# Patient Record
Sex: FEMALE | Race: WHITE
[De-identification: names, ages, dates, MRNs, and addresses within clinical notes are randomized per-mention and may not be internally consistent; named-entity substitution may affect disease eponyms.]

---

## 2019-07-12 ENCOUNTER — HOSPITAL ENCOUNTER (INPATIENT)
Dept: HOSPITAL 96 - M.ERS | Age: 44
LOS: 1 days | Discharge: HOME | DRG: 153 | End: 2019-07-13
Attending: INTERNAL MEDICINE | Admitting: INTERNAL MEDICINE
Payer: COMMERCIAL

## 2019-07-12 VITALS — SYSTOLIC BLOOD PRESSURE: 133 MMHG | DIASTOLIC BLOOD PRESSURE: 72 MMHG

## 2019-07-12 VITALS — DIASTOLIC BLOOD PRESSURE: 80 MMHG | SYSTOLIC BLOOD PRESSURE: 122 MMHG

## 2019-07-12 VITALS — BODY MASS INDEX: 26.66 KG/M2 | WEIGHT: 180 LBS | HEIGHT: 69.02 IN

## 2019-07-12 DIAGNOSIS — Z90.49: ICD-10-CM

## 2019-07-12 DIAGNOSIS — J45.909: ICD-10-CM

## 2019-07-12 DIAGNOSIS — Z91.018: ICD-10-CM

## 2019-07-12 DIAGNOSIS — T78.40XA: ICD-10-CM

## 2019-07-12 DIAGNOSIS — Z90.710: ICD-10-CM

## 2019-07-12 DIAGNOSIS — J02.9: Primary | ICD-10-CM

## 2019-07-12 DIAGNOSIS — Z88.8: ICD-10-CM

## 2019-07-12 DIAGNOSIS — R13.10: ICD-10-CM

## 2019-07-12 DIAGNOSIS — Z91.041: ICD-10-CM

## 2019-07-12 DIAGNOSIS — Z79.899: ICD-10-CM
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
## 2019-07-12 DIAGNOSIS — Z82.49: ICD-10-CM

## 2019-07-12 DIAGNOSIS — Z86.718: ICD-10-CM

## 2019-07-12 DIAGNOSIS — Z86.711: ICD-10-CM

## 2019-07-12 LAB
ABSOLUTE BASOPHILS: 0 THOU/UL (ref 0–0.2)
ABSOLUTE EOSINOPHILS: 0.1 THOU/UL (ref 0–0.7)
ABSOLUTE MONOCYTES: 0.8 THOU/UL (ref 0–1.2)
ANION GAP SERPL CALC-SCNC: 11 MMOL/L (ref 7–16)
BASOPHILS NFR BLD AUTO: 0.4 %
BUN SERPL-MCNC: 10 MG/DL (ref 7–18)
CALCIUM SERPL-MCNC: 8.8 MG/DL (ref 8.5–10.1)
CHLORIDE SERPL-SCNC: 103 MMOL/L (ref 98–107)
CO2 SERPL-SCNC: 27 MMOL/L (ref 21–32)
CREAT SERPL-MCNC: 0.8 MG/DL (ref 0.6–1.3)
EOSINOPHIL NFR BLD: 1.7 %
GLUCOSE SERPL-MCNC: 153 MG/DL (ref 70–99)
GRANULOCYTES NFR BLD MANUAL: 45.1 %
HCT VFR BLD CALC: 42.1 % (ref 37–47)
HGB BLD-MCNC: 14.5 GM/DL (ref 12–15)
LYMPHOCYTES # BLD: 3.1 THOU/UL (ref 0.8–5.3)
LYMPHOCYTES NFR BLD AUTO: 42.4 %
MCH RBC QN AUTO: 32.8 PG (ref 26–34)
MCHC RBC AUTO-ENTMCNC: 34.4 G/DL (ref 28–37)
MCV RBC: 95.2 FL (ref 80–100)
MONOCYTES NFR BLD: 10.4 %
MPV: 9.3 FL. (ref 7.2–11.1)
NEUTROPHILS # BLD: 3.3 THOU/UL (ref 1.6–8.1)
NUCLEATED RBCS: 0 /100WBC
PLATELET COUNT*: 281 THOU/UL (ref 150–400)
POTASSIUM SERPL-SCNC: 3.2 MMOL/L (ref 3.5–5.1)
RBC # BLD AUTO: 4.43 MIL/UL (ref 4.2–5)
RDW-CV: 12.8 % (ref 10.5–14.5)
SODIUM SERPL-SCNC: 141 MMOL/L (ref 136–145)
WBC # BLD AUTO: 7.3 THOU/UL (ref 4–11)

## 2019-07-13 VITALS — SYSTOLIC BLOOD PRESSURE: 116 MMHG | DIASTOLIC BLOOD PRESSURE: 71 MMHG

## 2019-07-13 VITALS — DIASTOLIC BLOOD PRESSURE: 71 MMHG | SYSTOLIC BLOOD PRESSURE: 116 MMHG

## 2019-07-13 VITALS — DIASTOLIC BLOOD PRESSURE: 68 MMHG | SYSTOLIC BLOOD PRESSURE: 108 MMHG

## 2019-07-13 VITALS — SYSTOLIC BLOOD PRESSURE: 130 MMHG | DIASTOLIC BLOOD PRESSURE: 78 MMHG

## 2019-07-13 NOTE — NUR
RECEIVED REPORT FORM ANN AND ASSUMED CARE OF PT @ 0700.PT IS A/O
X4,VSS,TRACING SR ON THE MONITOR.IV PATENT WITH IVF INFUSING PER ORDERS.PT IS
CALM AND COOPERATIVE WITH NO C/O PAIN.PT LEFT RESTING IN BED WITH CALL LIGHT
WITHIN REACH.WILL CONTINUE TO MONITOR.

## 2019-07-13 NOTE — NUR
RECEIVED REPORT FROM ER [facility] AT 1909. PT ARRIVED TO UNIT AT 1930. NURSING
ASSESSMENT COMPLETED, SR ON CARDIAC MONITOR. IVF INFUSING. C/O NAUSEA THIS
SHIFT. IV ZOFRAN ADMINISTERED AND EFFECTIVE. HOURLY ROUNDING COMPLETED. CALL
LIGHT WITHIN REACH.

## 2019-07-13 NOTE — NUR
PT OK FOR DISCHARGE.PAPERWORK COMPLETED AND GIVEN TO PT.SCRIPT GIVEN WITH
EDUCATION.IV REMOVED. HEART MONITOR REMOVED AND RETUNRED TO NURSING
STATION.ALL PERSONAL BELONGINGS PACKED AND TAKEN WITH PT.PT WHEELED DOWN TO
PERSONAL VEHICLE.

## 2019-07-15 NOTE — EKG
[place] [address]
Phone:  [phone]                     ELECTROCARDIOGRAM REPORT      
_______________________________________________________________________________
 
Name:       YAMILETJAMILAH            Room:           [address]    DIS IN  
M.R.#:  [id number]      Account #:      [id number]  
Admission:  19     Attend Phys:    CHOLO Robbins
Discharge:  19     Date of Birth:  [birth year]  
         Report #: [id number]
    [id number]
_______________________________________________________________________________
THIS REPORT FOR:  //name//                      
 
                         [facility] ED
                                       
Test Date:    2019               Test Time:    16:12:13
Pat Name:     JAMILAH WOODARD        Department:   
Patient ID:   [id number]            Room:         [facility]
Gender:       F                        Technician:   KS
:          1975               Requested By: Bianca Vera
Order Number: [id number] MD:   Rl Washington
                                 Measurements
Intervals                              Axis          
Rate:         109                      P:            48
CO:           110                      QRS:          -13
QRSD:         91                       T:            48
QT:           349                                    
QTc:          471                                    
                           Interpretive Statements
Sinus tachycardia
Atrial premature complex
Low voltage, precordial leads
Minimal ST depression, lateral leads
Compared to ECG 2013 05:00:27
Atrial premature complex(es) now present
ST (T wave) deviation now present
 
Electronically Signed On 7- 12:15:49 CDT by Rl Washington
[email or web address]
 
 
 
 
 
 
 
 
 
 
 
 
 
 
 
  <ELECTRONICALLY SIGNED>
                                           By: Rl Washington MD, FACC     
  07/15/19     1215
D: 19 1612   _____________________________________
T: 19 1612   Rl Washington MD, FAC       /EPI

## 2019-12-09 ENCOUNTER — HOSPITAL ENCOUNTER (EMERGENCY)
Dept: HOSPITAL 96 - M.ERS | Age: 44
Discharge: HOME | End: 2019-12-09
Payer: COMMERCIAL

## 2019-12-09 VITALS — WEIGHT: 176 LBS | BODY MASS INDEX: 26.67 KG/M2 | HEIGHT: 68 IN

## 2019-12-09 VITALS — SYSTOLIC BLOOD PRESSURE: 125 MMHG | DIASTOLIC BLOOD PRESSURE: 79 MMHG

## 2019-12-09 DIAGNOSIS — Z90.710: ICD-10-CM

## 2019-12-09 DIAGNOSIS — K52.9: Primary | ICD-10-CM

## 2019-12-09 DIAGNOSIS — N80.9: ICD-10-CM

## 2019-12-09 DIAGNOSIS — Z86.711: ICD-10-CM

## 2019-12-09 DIAGNOSIS — Z86.718: ICD-10-CM

## 2019-12-09 DIAGNOSIS — Z88.8: ICD-10-CM

## 2019-12-09 DIAGNOSIS — Z91.041: ICD-10-CM

## 2019-12-09 DIAGNOSIS — Z91.018: ICD-10-CM

## 2019-12-09 DIAGNOSIS — Z90.49: ICD-10-CM

## 2019-12-09 DIAGNOSIS — J45.909: ICD-10-CM

## 2019-12-09 LAB
ABSOLUTE BASOPHILS: 0 THOU/UL (ref 0–0.2)
ABSOLUTE EOSINOPHILS: 0 THOU/UL (ref 0–0.7)
ABSOLUTE MONOCYTES: 0.5 THOU/UL (ref 0–1.2)
ALBUMIN SERPL-MCNC: 3.6 G/DL (ref 3.4–5)
ALP SERPL-CCNC: 61 U/L (ref 46–116)
ALT SERPL-CCNC: 38 U/L (ref 30–65)
ANION GAP SERPL CALC-SCNC: 11 MMOL/L (ref 7–16)
APTT BLD: 29.6 SECONDS (ref 25–31.3)
AST SERPL-CCNC: 34 U/L (ref 15–37)
BASOPHILS NFR BLD AUTO: 0.7 %
BILIRUB SERPL-MCNC: 0.7 MG/DL
BILIRUB UR-MCNC: NEGATIVE MG/DL
BUN SERPL-MCNC: 7 MG/DL (ref 7–18)
CALCIUM SERPL-MCNC: 8.5 MG/DL (ref 8.5–10.1)
CHLORIDE SERPL-SCNC: 103 MMOL/L (ref 98–107)
CO2 SERPL-SCNC: 25 MMOL/L (ref 21–32)
COLOR UR: YELLOW
CREAT SERPL-MCNC: 0.7 MG/DL (ref 0.6–1.3)
EOSINOPHIL NFR BLD: 1.2 %
GLUCOSE SERPL-MCNC: 80 MG/DL (ref 70–99)
GRANULOCYTES NFR BLD MANUAL: 51.6 %
HCT VFR BLD CALC: 40.7 % (ref 37–47)
HGB BLD-MCNC: 14.5 GM/DL (ref 12–15)
INR PPP: 1
KETONES UR STRIP-MCNC: NEGATIVE MG/DL
LIPASE: 47 U/L (ref 73–393)
LYMPHOCYTES # BLD: 1.3 THOU/UL (ref 0.8–5.3)
LYMPHOCYTES NFR BLD AUTO: 33 %
MCH RBC QN AUTO: 33.3 PG (ref 26–34)
MCHC RBC AUTO-ENTMCNC: 35.5 G/DL (ref 28–37)
MCV RBC: 93.8 FL (ref 80–100)
MONOCYTES NFR BLD: 13.5 %
MPV: 9.8 FL. (ref 7.2–11.1)
NEUTROPHILS # BLD: 2 THOU/UL (ref 1.6–8.1)
NUCLEATED RBCS: 0 /100WBC
PLATELET COUNT*: 219 THOU/UL (ref 150–400)
POTASSIUM SERPL-SCNC: 3.8 MMOL/L (ref 3.5–5.1)
PROT SERPL-MCNC: 7.3 G/DL (ref 6.4–8.2)
PROT UR QL STRIP: NEGATIVE
PROTHROMBIN TIME: 10.7 SECONDS (ref 9.2–11.5)
RBC # BLD AUTO: 4.34 MIL/UL (ref 4.2–5)
RBC # UR STRIP: (no result) /UL
RDW-CV: 11.8 % (ref 10.5–14.5)
SODIUM SERPL-SCNC: 139 MMOL/L (ref 136–145)
SP GR UR STRIP: <= 1.005 (ref 1–1.03)
URINE CLARITY: CLEAR
URINE GLUCOSE-RANDOM: NEGATIVE
URINE LEUKOCYTES-REFLEX: NEGATIVE
URINE NITRITE-REFLEX: NEGATIVE
UROBILINOGEN UR STRIP-ACNC: 0.2 E.U./DL (ref 0.2–1)
WBC # BLD AUTO: 3.8 THOU/UL (ref 4–11)

## 2019-12-09 NOTE — EKG
[place] [address]
Phone:  [phone]                     ELECTROCARDIOGRAM REPORT      
_______________________________________________________________________________
 
Name:       YAMILETMARIZOLJAIME JOCELINE            Room:                      [facility]#:  [id number]      Account #:      [id number]  
Admission:  19     Attend Phys:                         
Discharge:  19     Date of Birth:  [birth year]  
         Report #: [id number]
    [id number]
_______________________________________________________________________________
THIS REPORT FOR:  //name//                      
 
                         [facility] ED
                                       
Test Date:    2019               Test Time:    10:36:56
Pat Name:     JAMILAH WOODARD        Department:   
Patient ID:   [id number]            Room:          
Gender:       F                        Technician:   ELIANE
:          1975               Requested By: Bianca Vera
Order Number: [id number] MD:   Attila Tapia
                                 Measurements
Intervals                              Axis          
Rate:         77                       P:            31
AL:           127                      QRS:          -20
QRSD:         115                      T:            83
QT:           415                                    
QTc:          470                                    
                           Interpretive Statements
Sinus rhythm
Nonspecific intraventricular conduction delay
Low voltage, extremity and precordial leads
Minimal ST depression, lateral leads
Compared to ECG 2019 16:12:13
 
Sinus tachycardia no longer present
ST (T wave) deviation still present
 
Electronically Signed On 2019 15:27:28 CST by Attila Tapia
[email or web address]
 
 
 
 
 
 
 
 
 
 
 
 
 
 
  <ELECTRONICALLY SIGNED>
                                           By: Attila Tapia MD, FAC      
  19     1527
D: 19 1036   _____________________________________
T: 19 1036   Attila Tapia MD, [facility]        /EPI

## 2021-04-09 ENCOUNTER — HOSPITAL ENCOUNTER (EMERGENCY)
Dept: HOSPITAL 96 - M.ERS | Age: 46
Discharge: HOME | End: 2021-04-09
Payer: COMMERCIAL

## 2021-04-09 VITALS — WEIGHT: 198 LBS | HEIGHT: 69 IN | BODY MASS INDEX: 29.33 KG/M2

## 2021-04-09 VITALS — DIASTOLIC BLOOD PRESSURE: 90 MMHG | SYSTOLIC BLOOD PRESSURE: 140 MMHG

## 2021-04-09 DIAGNOSIS — Z88.8: ICD-10-CM

## 2021-04-09 DIAGNOSIS — Z86.711: ICD-10-CM

## 2021-04-09 DIAGNOSIS — J45.909: ICD-10-CM

## 2021-04-09 DIAGNOSIS — Z91.041: ICD-10-CM

## 2021-04-09 DIAGNOSIS — N80.9: ICD-10-CM

## 2021-04-09 DIAGNOSIS — Z20.822: ICD-10-CM

## 2021-04-09 DIAGNOSIS — Z86.718: ICD-10-CM

## 2021-04-09 DIAGNOSIS — Z90.49: ICD-10-CM

## 2021-04-09 DIAGNOSIS — Z91.018: ICD-10-CM

## 2021-04-09 DIAGNOSIS — K31.84: ICD-10-CM

## 2021-04-09 DIAGNOSIS — R06.00: Primary | ICD-10-CM

## 2021-04-09 DIAGNOSIS — Z90.710: ICD-10-CM

## 2021-04-09 LAB
ABSOLUTE BASOPHILS: (no result) THOU/UL (ref 0–0.2)
ABSOLUTE BASOPHILS: 0 THOU/UL (ref 0–0.2)
ABSOLUTE EOSINOPHILS: (no result) THOU/UL (ref 0–0.7)
ABSOLUTE EOSINOPHILS: 0.1 THOU/UL (ref 0–0.7)
ABSOLUTE MONOCYTES: (no result) THOU/UL (ref 0–1.2)
ABSOLUTE MONOCYTES: 0.7 THOU/UL (ref 0–1.2)
ALBUMIN SERPL-MCNC: (no result) G/DL (ref 3.4–5)
ALBUMIN SERPL-MCNC: 3.8 G/DL (ref 3.4–5)
ALP SERPL-CCNC: (no result) U/L (ref 46–116)
ALP SERPL-CCNC: 66 U/L (ref 46–116)
ALT SERPL-CCNC: (no result) U/L (ref 30–65)
ALT SERPL-CCNC: 63 U/L (ref 30–65)
ANION GAP SERPL CALC-SCNC: (no result) MMOL/L (ref 7–16)
ANION GAP SERPL CALC-SCNC: 7 MMOL/L (ref 7–16)
AST SERPL-CCNC: (no result) U/L (ref 15–37)
AST SERPL-CCNC: 27 U/L (ref 15–37)
BASOPHILS NFR BLD AUTO: (no result) %
BASOPHILS NFR BLD AUTO: 0.6 %
BILIRUB SERPL-MCNC: (no result) MG/DL
BILIRUB SERPL-MCNC: 0.5 MG/DL
BILIRUB UR-MCNC: NEGATIVE MG/DL
BUN SERPL-MCNC: (no result) MG/DL (ref 7–18)
BUN SERPL-MCNC: 9 MG/DL (ref 7–18)
CALCIUM SERPL-MCNC: (no result) MG/DL (ref 8.5–10.1)
CALCIUM SERPL-MCNC: 9.1 MG/DL (ref 8.5–10.1)
CHLORIDE SERPL-SCNC: (no result) MMOL/L (ref 98–107)
CHLORIDE SERPL-SCNC: 106 MMOL/L (ref 98–107)
CO2 SERPL-SCNC: (no result) MMOL/L (ref 21–32)
CO2 SERPL-SCNC: 28 MMOL/L (ref 21–32)
COLOR UR: YELLOW
CREAT SERPL-MCNC: (no result) MG/DL (ref 0.6–1.3)
CREAT SERPL-MCNC: 0.8 MG/DL (ref 0.6–1.3)
EOSINOPHIL NFR BLD: (no result) %
EOSINOPHIL NFR BLD: 2 %
GLUCOSE SERPL-MCNC: (no result) MG/DL (ref 70–99)
GLUCOSE SERPL-MCNC: 95 MG/DL (ref 70–99)
GRANULOCYTES NFR BLD MANUAL: (no result) %
GRANULOCYTES NFR BLD MANUAL: 47.8 %
HCT VFR BLD CALC: (no result) % (ref 37–47)
HCT VFR BLD CALC: 43.6 % (ref 37–47)
HGB BLD-MCNC: (no result) GM/DL (ref 12–15)
HGB BLD-MCNC: 15 GM/DL (ref 12–15)
INR PPP: (no result)
KETONES UR STRIP-MCNC: NEGATIVE MG/DL
LYMPHOCYTES # BLD: (no result) THOU/UL (ref 0.8–5.3)
LYMPHOCYTES # BLD: 2 THOU/UL (ref 0.8–5.3)
LYMPHOCYTES NFR BLD AUTO: (no result) %
LYMPHOCYTES NFR BLD AUTO: 37.1 %
MAGNESIUM SERPL-MCNC: (no result) MG/DL (ref 1.8–2.4)
MCH RBC QN AUTO: (no result) PG (ref 26–34)
MCH RBC QN AUTO: 34.5 PG (ref 26–34)
MCHC RBC AUTO-ENTMCNC: (no result) G/DL (ref 28–37)
MCHC RBC AUTO-ENTMCNC: 34.3 G/DL (ref 28–37)
MCV RBC: (no result) FL (ref 80–100)
MCV RBC: 100.4 FL (ref 80–100)
MONOCYTES NFR BLD: (no result) %
MONOCYTES NFR BLD: 12.5 %
MPV: (no result) FL. (ref 7.2–11.1)
MPV: 9.5 FL. (ref 7.2–11.1)
NEUTROPHILS # BLD: (no result) THOU/UL (ref 1.6–8.1)
NEUTROPHILS # BLD: 2.6 THOU/UL (ref 1.6–8.1)
NT-PRO BRAIN NAT PEPTIDE: (no result) PG/ML (ref ?–300)
NUCLEATED RBCS: (no result) /100WBC
NUCLEATED RBCS: 0 /100WBC
PLATELET # BLD EST: (no result) 10*3/UL
PLATELET COUNT*: (no result) THOU/UL (ref 150–400)
PLATELET COUNT*: 208 THOU/UL (ref 150–400)
POTASSIUM SERPL-SCNC: (no result) MMOL/L (ref 3.5–5.1)
POTASSIUM SERPL-SCNC: 3.7 MMOL/L (ref 3.5–5.1)
PROT SERPL-MCNC: (no result) G/DL (ref 6.4–8.2)
PROT SERPL-MCNC: 7.4 G/DL (ref 6.4–8.2)
PROT UR QL STRIP: NEGATIVE
PROTHROMBIN TIME: (no result) SECONDS (ref 9.2–11.5)
RBC # BLD AUTO: (no result) MIL/UL (ref 4.2–5)
RBC # BLD AUTO: 4.34 MIL/UL (ref 4.2–5)
RBC # UR STRIP: NEGATIVE /UL
RDW-CV: (no result) % (ref 10.5–14.5)
RDW-CV: 13.3 % (ref 10.5–14.5)
SODIUM SERPL-SCNC: (no result) MMOL/L (ref 136–145)
SODIUM SERPL-SCNC: 141 MMOL/L (ref 136–145)
SP GR UR STRIP: 1.01 (ref 1–1.03)
URINE CLARITY: CLEAR
URINE GLUCOSE-RANDOM: NEGATIVE
URINE LEUKOCYTES-REFLEX: NEGATIVE
URINE NITRITE-REFLEX: NEGATIVE
UROBILINOGEN UR STRIP-ACNC: 0.2 E.U./DL (ref 0.2–1)
WBC # BLD AUTO: (no result) THOU/UL (ref 4–11)
WBC # BLD AUTO: 5.4 THOU/UL (ref 4–11)

## 2021-04-11 NOTE — EKG
[place] [address]
Phone:  [phone]                     ELECTROCARDIOGRAM REPORT      
_______________________________________________________________________________
 
Name:         YAMILETJAMILAH JOCELINE           Room:                     [facility]#:    [id number]     Account #:     [id number]  
Admission:    21    Attend Phys:                     
Discharge:    21    Date of Birth: [birth year]  
Date of Service: 21 190  Report #:      [id number]
        [id number]
_______________________________________________________________________________
THIS REPORT FOR:  //name//                      
 
                         [facility] ED
                                       
Test Date:    2021               Test Time:    19:04:21
Pat Name:     JAMILAH WOODARD        Department:   
Patient ID:   [id number]            Room:          
Gender:       F                        Technician:   CROW
:          1975               Requested By: Casie Hui
Order Number: [id number] MD:   Attila Tapia
                                 Measurements
Intervals                              Axis          
Rate:         84                       P:            29
CA:           135                      QRS:          -8
QRSD:         85                       T:            25
QT:           407                                    
QTc:          482                                    
                           Interpretive Statements
Sinus rhythm
Low voltage, precordial leads
Borderline prolonged QT interval
Compared to ECG 2019 10:36:56
 
ST (T wave) deviation no longer present
Electronically Signed On 2021 9:40:57 CDT by Attila Tapia
[email or web address]
 
 
 
 
 
 
 
 
 
 
 
 
 
 
 
 
 
 
  <ELECTRONICALLY SIGNED>
                                           By: Attila Tapia MD, formerly [facility]      
  21     0940
D: 21 1904   _____________________________________
T: 21 1904   Attila Tapia MD, formerly [facility]        /EPI

## 2022-02-19 ENCOUNTER — HOSPITAL ENCOUNTER (EMERGENCY)
Dept: HOSPITAL 96 - M.ERS | Age: 47
Discharge: HOME | End: 2022-02-19
Payer: COMMERCIAL

## 2022-02-19 VITALS — BODY MASS INDEX: 27.4 KG/M2 | HEIGHT: 69 IN | WEIGHT: 185.01 LBS

## 2022-02-19 VITALS — DIASTOLIC BLOOD PRESSURE: 71 MMHG | SYSTOLIC BLOOD PRESSURE: 111 MMHG

## 2022-02-19 DIAGNOSIS — Z90.49: ICD-10-CM
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
## 2022-02-19 DIAGNOSIS — J45.909: ICD-10-CM

## 2022-02-19 DIAGNOSIS — Z90.710: ICD-10-CM

## 2022-02-19 DIAGNOSIS — Z79.899: ICD-10-CM

## 2022-02-19 DIAGNOSIS — Z91.041: ICD-10-CM

## 2022-02-19 DIAGNOSIS — Z91.02: ICD-10-CM

## 2022-02-19 DIAGNOSIS — T78.40XA: Primary | ICD-10-CM

## 2022-02-19 DIAGNOSIS — Z88.8: ICD-10-CM
